# Patient Record
Sex: FEMALE | Race: WHITE | URBAN - METROPOLITAN AREA
[De-identification: names, ages, dates, MRNs, and addresses within clinical notes are randomized per-mention and may not be internally consistent; named-entity substitution may affect disease eponyms.]

---

## 2020-02-28 ENCOUNTER — APPOINTMENT (RX ONLY)
Dept: URBAN - METROPOLITAN AREA CLINIC 28 | Facility: CLINIC | Age: 40
Setting detail: DERMATOLOGY
End: 2020-02-28

## 2020-02-28 DIAGNOSIS — B07.8 OTHER VIRAL WARTS: ICD-10-CM | Status: IMPROVED

## 2020-02-28 PROCEDURE — 17110 DESTRUCTION B9 LES UP TO 14: CPT

## 2020-02-28 PROCEDURE — ? LIQUID NITROGEN

## 2020-02-28 ASSESSMENT — LOCATION SIMPLE DESCRIPTION DERM: LOCATION SIMPLE: RIGHT PLANTAR SURFACE

## 2020-02-28 ASSESSMENT — TOTAL NUMBER OF VERRUCA VULGARIS: # OF LESIONS?: 2

## 2020-02-28 ASSESSMENT — LOCATION DETAILED DESCRIPTION DERM: LOCATION DETAILED: RIGHT PLANTAR FOREFOOT OVERLYING 1ST METATARSAL

## 2020-02-28 ASSESSMENT — LOCATION ZONE DERM: LOCATION ZONE: FEET

## 2020-02-28 ASSESSMENT — AREA OF WARTS IN CM2: TOTAL AREA OF ALL WARTS IN CM2: 0

## 2020-02-28 NOTE — PROCEDURE: LIQUID NITROGEN
Detail Level: Zone
Post-Care Instructions: I reviewed with the patient in detail post-care instructions. Patient is to wear sunprotection, and avoid picking at any of the treated lesions. Pt may apply Vaseline to crusted or scabbing areas.
Pared With?: 15 blade
Medical Necessity Information: It is in your best interest to select a reason for this procedure from the list below. All of these items fulfill various CMS LCD requirements except the new and changing color options.
Consent: The patient's consent was obtained including but not limited to risks of crusting, scabbing, blistering, scarring, darker or lighter pigmentary change, recurrence, incomplete removal and infection.
Medical Necessity Clause: This procedure was medically necessary because the lesions that were treated were:
Total Number Of Lesions Treated: 1
Add 52 Modifier (Optional): no
Duration Of Freeze Thaw-Cycle (Seconds): 0
Render Post Care In The Note?: yes